# Patient Record
Sex: FEMALE | Race: WHITE | NOT HISPANIC OR LATINO | Employment: FULL TIME | URBAN - METROPOLITAN AREA
[De-identification: names, ages, dates, MRNs, and addresses within clinical notes are randomized per-mention and may not be internally consistent; named-entity substitution may affect disease eponyms.]

---

## 2023-10-10 ENCOUNTER — ANCILLARY PROCEDURE (OUTPATIENT)
Dept: RADIOLOGY | Facility: CLINIC | Age: 53
End: 2023-10-10
Payer: COMMERCIAL

## 2023-10-10 DIAGNOSIS — M45.0 ANKYLOSING SPONDYLITIS OF MULTIPLE SITES IN SPINE (MULTI): ICD-10-CM

## 2023-10-10 DIAGNOSIS — M47.22 OTHER SPONDYLOSIS WITH RADICULOPATHY, CERVICAL REGION: ICD-10-CM

## 2023-10-10 DIAGNOSIS — M50.320 OTHER CERVICAL DISC DEGENERATION, MID-CERVICAL REGION, UNSPECIFIED LEVEL: ICD-10-CM

## 2023-10-10 PROCEDURE — 73521 X-RAY EXAM HIPS BI 2 VIEWS: CPT | Mod: FY

## 2023-10-10 PROCEDURE — 72070 X-RAY EXAM THORAC SPINE 2VWS: CPT | Mod: FY

## 2023-10-10 PROCEDURE — 72141 MRI NECK SPINE W/O DYE: CPT

## 2023-10-17 ENCOUNTER — APPOINTMENT (OUTPATIENT)
Dept: SPORTS MEDICINE | Facility: HOSPITAL | Age: 53
End: 2023-10-17
Payer: COMMERCIAL

## 2023-11-02 DIAGNOSIS — F32.A DEPRESSION, UNSPECIFIED DEPRESSION TYPE: Primary | ICD-10-CM

## 2023-11-02 RX ORDER — VENLAFAXINE HYDROCHLORIDE 75 MG/1
75 CAPSULE, EXTENDED RELEASE ORAL
Qty: 90 CAPSULE | Refills: 0 | Status: SHIPPED | OUTPATIENT
Start: 2023-11-02 | End: 2023-11-22 | Stop reason: SDUPTHER

## 2023-11-02 RX ORDER — VENLAFAXINE HYDROCHLORIDE 75 MG/1
75 CAPSULE, EXTENDED RELEASE ORAL
COMMUNITY
End: 2023-11-02 | Stop reason: SDUPTHER

## 2023-11-21 DIAGNOSIS — E53.8 B12 DEFICIENCY: Primary | ICD-10-CM

## 2023-11-21 RX ORDER — AMANTADINE HYDROCHLORIDE 100 MG/1
2 TABLET ORAL DAILY
COMMUNITY
Start: 2021-10-11

## 2023-11-21 RX ORDER — CYANOCOBALAMIN 1000 UG/ML
1000 INJECTION, SOLUTION INTRAMUSCULAR; SUBCUTANEOUS ONCE
Qty: 1 ML | Refills: 11 | Status: SHIPPED | OUTPATIENT
Start: 2023-11-21 | End: 2023-11-21

## 2023-11-22 DIAGNOSIS — F32.A DEPRESSION, UNSPECIFIED DEPRESSION TYPE: ICD-10-CM

## 2023-11-22 DIAGNOSIS — F32.A DEPRESSION, UNSPECIFIED DEPRESSION TYPE: Primary | ICD-10-CM

## 2023-11-22 RX ORDER — VENLAFAXINE HYDROCHLORIDE 150 MG/1
150 CAPSULE, EXTENDED RELEASE ORAL DAILY
Qty: 90 CAPSULE | Refills: 0 | Status: SHIPPED | OUTPATIENT
Start: 2023-11-22 | End: 2024-02-22

## 2023-11-22 RX ORDER — VENLAFAXINE HYDROCHLORIDE 75 MG/1
75 CAPSULE, EXTENDED RELEASE ORAL
Qty: 90 CAPSULE | Refills: 0 | Status: SHIPPED | OUTPATIENT
Start: 2023-11-22 | End: 2023-11-22 | Stop reason: ALTCHOICE

## 2024-02-02 DIAGNOSIS — N39.0 URINARY TRACT INFECTION WITHOUT HEMATURIA, SITE UNSPECIFIED: Primary | ICD-10-CM

## 2024-02-02 DIAGNOSIS — F32.A DEPRESSION, UNSPECIFIED DEPRESSION TYPE: ICD-10-CM

## 2024-02-02 DIAGNOSIS — R42 DIZZY: ICD-10-CM

## 2024-02-02 RX ORDER — SCOLOPAMINE TRANSDERMAL SYSTEM 1 MG/1
1 PATCH, EXTENDED RELEASE TRANSDERMAL
Qty: 6 PATCH | Refills: 0 | Status: SHIPPED | OUTPATIENT
Start: 2024-02-02

## 2024-02-02 RX ORDER — VENLAFAXINE HYDROCHLORIDE 75 MG/1
75 CAPSULE, EXTENDED RELEASE ORAL DAILY
Qty: 90 CAPSULE | Refills: 0 | Status: SHIPPED | OUTPATIENT
Start: 2024-02-02 | End: 2024-04-30

## 2024-02-02 RX ORDER — SCOLOPAMINE TRANSDERMAL SYSTEM 1 MG/1
PATCH, EXTENDED RELEASE TRANSDERMAL
COMMUNITY
Start: 2016-06-12 | End: 2024-02-02 | Stop reason: SDUPTHER

## 2024-02-02 RX ORDER — SULFAMETHOXAZOLE AND TRIMETHOPRIM 800; 160 MG/1; MG/1
1 TABLET ORAL 2 TIMES DAILY
Qty: 10 TABLET | Refills: 0 | Status: SHIPPED | OUTPATIENT
Start: 2024-02-02 | End: 2024-02-07

## 2024-02-02 RX ORDER — FLUCONAZOLE 150 MG/1
150 TABLET ORAL ONCE
Qty: 1 TABLET | Refills: 0 | Status: SHIPPED | OUTPATIENT
Start: 2024-02-02 | End: 2024-02-02

## 2024-02-02 RX ORDER — SULFAMETHOXAZOLE AND TRIMETHOPRIM 800; 160 MG/1; MG/1
1 TABLET ORAL 2 TIMES DAILY
COMMUNITY
Start: 2015-12-29 | End: 2024-02-02 | Stop reason: SDUPTHER

## 2024-02-02 RX ORDER — MECLIZINE HYDROCHLORIDE 25 MG/1
25 TABLET ORAL 3 TIMES DAILY PRN
Qty: 30 TABLET | Refills: 0 | Status: SHIPPED | OUTPATIENT
Start: 2024-02-02 | End: 2025-02-01

## 2024-02-02 NOTE — PROGRESS NOTES
Patient traveling soon, due to urinary infection agreed on Bactrim and Diflucan prescription.    Refills needed on scopolamine patch meclizine and Effexor.

## 2024-02-19 DIAGNOSIS — F32.A DEPRESSION, UNSPECIFIED DEPRESSION TYPE: ICD-10-CM

## 2024-02-22 RX ORDER — VENLAFAXINE HYDROCHLORIDE 150 MG/1
150 CAPSULE, EXTENDED RELEASE ORAL DAILY
Qty: 90 CAPSULE | Refills: 0 | Status: SHIPPED | OUTPATIENT
Start: 2024-02-22 | End: 2024-06-03 | Stop reason: SDUPTHER

## 2024-03-13 ENCOUNTER — SPECIALTY PHARMACY (OUTPATIENT)
Dept: PHARMACY | Facility: CLINIC | Age: 54
End: 2024-03-13

## 2024-03-27 ENCOUNTER — SPECIALTY PHARMACY (OUTPATIENT)
Dept: PHARMACY | Facility: CLINIC | Age: 54
End: 2024-03-27

## 2024-04-24 ENCOUNTER — SPECIALTY PHARMACY (OUTPATIENT)
Dept: PHARMACY | Facility: CLINIC | Age: 54
End: 2024-04-24

## 2024-04-29 DIAGNOSIS — F32.A DEPRESSION, UNSPECIFIED DEPRESSION TYPE: ICD-10-CM

## 2024-04-30 RX ORDER — VENLAFAXINE HYDROCHLORIDE 75 MG/1
75 CAPSULE, EXTENDED RELEASE ORAL DAILY
Qty: 90 CAPSULE | Refills: 0 | Status: SHIPPED | OUTPATIENT
Start: 2024-04-30 | End: 2024-06-03 | Stop reason: SDUPTHER

## 2024-05-01 PROCEDURE — RXMED WILLOW AMBULATORY MEDICATION CHARGE

## 2024-05-02 ENCOUNTER — SPECIALTY PHARMACY (OUTPATIENT)
Dept: PHARMACY | Facility: CLINIC | Age: 54
End: 2024-05-02

## 2024-05-02 ENCOUNTER — PHARMACY VISIT (OUTPATIENT)
Dept: PHARMACY | Facility: CLINIC | Age: 54
End: 2024-05-02
Payer: COMMERCIAL

## 2024-05-03 ENCOUNTER — SPECIALTY PHARMACY (OUTPATIENT)
Dept: PHARMACY | Facility: CLINIC | Age: 54
End: 2024-05-03

## 2024-05-30 ENCOUNTER — SPECIALTY PHARMACY (OUTPATIENT)
Dept: PHARMACY | Facility: CLINIC | Age: 54
End: 2024-05-30

## 2024-05-30 ENCOUNTER — PHARMACY VISIT (OUTPATIENT)
Dept: PHARMACY | Facility: CLINIC | Age: 54
End: 2024-05-30
Payer: COMMERCIAL

## 2024-05-30 PROCEDURE — RXMED WILLOW AMBULATORY MEDICATION CHARGE

## 2024-06-03 ENCOUNTER — APPOINTMENT (OUTPATIENT)
Dept: SPORTS MEDICINE | Facility: HOSPITAL | Age: 54
End: 2024-06-03
Payer: COMMERCIAL

## 2024-06-03 DIAGNOSIS — F32.A DEPRESSION, UNSPECIFIED DEPRESSION TYPE: ICD-10-CM

## 2024-06-03 RX ORDER — VENLAFAXINE HYDROCHLORIDE 75 MG/1
75 CAPSULE, EXTENDED RELEASE ORAL DAILY
Qty: 90 CAPSULE | Refills: 0 | Status: SHIPPED | OUTPATIENT
Start: 2024-06-03 | End: 2024-09-01

## 2024-06-03 RX ORDER — VENLAFAXINE HYDROCHLORIDE 150 MG/1
150 CAPSULE, EXTENDED RELEASE ORAL DAILY
Qty: 90 CAPSULE | Refills: 0 | Status: SHIPPED | OUTPATIENT
Start: 2024-06-03 | End: 2024-09-01

## 2024-06-21 ENCOUNTER — SPECIALTY PHARMACY (OUTPATIENT)
Dept: RHEUMATOLOGY | Facility: CLINIC | Age: 54
End: 2024-06-21
Payer: COMMERCIAL

## 2024-06-24 ENCOUNTER — APPOINTMENT (OUTPATIENT)
Dept: PRIMARY CARE | Facility: CLINIC | Age: 54
End: 2024-06-24
Payer: COMMERCIAL

## 2024-06-24 VITALS — HEIGHT: 66 IN | BODY MASS INDEX: 24.05 KG/M2

## 2024-06-24 DIAGNOSIS — Z15.89 HLA B27 (HLA B27 POSITIVE): Primary | ICD-10-CM

## 2024-06-24 PROCEDURE — 1036F TOBACCO NON-USER: CPT | Performed by: FAMILY MEDICINE

## 2024-06-24 PROCEDURE — 99213 OFFICE O/P EST LOW 20 MIN: CPT | Performed by: FAMILY MEDICINE

## 2024-06-24 ASSESSMENT — ENCOUNTER SYMPTOMS
DEPRESSION: 0
OCCASIONAL FEELINGS OF UNSTEADINESS: 0
LOSS OF SENSATION IN FEET: 0

## 2024-06-24 NOTE — PROGRESS NOTES
Pt has virt fuv, no concerns.       Chief Complaint:  No chief complaint on file.  History Of Present Illness:   Gaby Mcnamara is a 54 y.o. female        Gaby Mcnamara is a 54 year old female presenting for a virtual visit.    Virtual or Telephone Consent    An interactive audio and video telecommunication system which permits real time communications between the patient (at the originating site) and provider (at the distant site) was utilized to provide this telehealth service.   Verbal consent was requested and obtained from Gaby Mcnamara on this date, 06/24/24 for a telehealth visit.            Weight: She has been using saxenda off-and-on since 2016.  Her weight history: November 2016 weight was 206 pounds. March 2018 her weight was 185 pounds. Dec 2019 she 195 pounds. Feb 20, 2020: 181.   Aug 11, 2020: 170 lbs.   Her goal weight is 165 pounds.  Saxenda very helpful for her and she is not getting the same results off the medication.   Aug 2021 update: she's gained weight. off saxenda now. going through treatments with rheumatology.   September 2021 update: She did get the wegovy injection.  Jan 2022 update: she is close to her goal weight.   Feb 2022- wegovy coupon ran out. She went on her cruise and was at 155 lbs which was her goal weight and she was able to maintain.   April 9, 2022- on her birthday she was enjoying cupcakes with her family and she felt strange, pt's sister has DM and checked pt's blood sugar which was 120. we had a discussion that although pt's last hemoglobin A1c did not show diabetes, that her story is consistent with hyperglycemia. Weight today: 172 lbs.   June 9, 2022: 158 lbs.   Nov 2022 update: 152 lbs. (goal 148-152 lbs). ozempic. but there is a insurance change. 0.5mg every 2 weeks. plan to fill 0.5mg and then it will last her several weeks. also consider wegovy if it starts being covered.   April 2023 update: weight is about 140 lbs.   June 2024 update: was around 150lbs, in Feb   she was on prednisone for a month and is closer to 175 lbs. She did go a 3rd party clinic and is taking monjuro but not losing weight.           Ortho- had b/l hip injections. b/l knees. Right distal humerus pain. left hip, left knee, left foot tendinitis and plantar fasciitis.     Mood- doing well on effexor. Adding buspar Oct 2019 has been very helpful. Stress: her mother  after 2020. 2021 update: Her Effexor was recently increased.  2022: Regarding her Effexor prescription, she originally stated that she would like 225 mg capsules. We tried a prescription but unfortunately it was not covered. We then agreed to lower the dosage to 150 mg daily, she states that she believes that she will do well on this dosage.  2024 update: current dosage of 225mg of effexor.      Positive HLA-B27. seeing rheumatology. 2022 update: Dr. Schmidt was filling Velafaxine, topamax and rx naltrexone compounding pharmacy. I agreed for a possible 3 month gap rx for the compound naltrexone in between rheumatologists.   2024 update: has an independent rheum- Dr. Sierra Moss for rheumatology. She is on Taltz injections once a month. On tizanadine at night. Also 300mg gabapentin at night time.     B12 def- on B12.     Vit D def- on vit D.        Other PMH: history of dizziness. Hx of IBS with constipation.      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      PSH: right breast surgery. uterine ablation. bladder lifting procedure.   FH: father: MI at age 49, psoriatic arthritis. mother: Alzheimer's.      COVID: got the vaccine.      Colonoscopy: neg cologuadot Aug 2021.      Healthcare team:  - rheum- 2024 update: has an independent rheumatologist.     Tobacco: never      Work: DME for .     Colonoscopy:     GYN: has a GYN                       Review of Systems:     Negative  Constitutional:   - fever   - chills   - night sweats  - unexpected weight change  - changes in sleep    "  Eyes:   - loss of vision  - double vision  - floaters     Ear/Nose/Throat/Mouth:   - sore throat  - hearing changes  - sinus congestion     Cardiovascular:   - chest pain  - chest heaviness  - palpitations  - swelling in ankles          Respiratory:   - shortness of breath  - difficulty breathing  - frequent cough  - wheezing     Neurological:   - loss of consciousness  - tremors  - dizzy spells  - numbness   - tingling     Gastrointestinal:   - abdominal pain  - nausea  - vomiting  - constipation  - diarrhea  - bloody stools  - loss of bowel control  - indigestion  - heartburn  - sour taste in throat when waking up     Genitourinary:   - urinary incontinence  - increased urinary frequency  - painful urination  - blood in urine     Skin:   - Rash  - lumps or bumps  - worrisome moles     Endocrine:   - excessive thirst  - feeling too hot  - feeling too cold  - feeling tired  - fatigue      Hematologic/Lymphatic:   - swollen glands  - blood clotting problems  - easy bruising.               Positive  Psychological:   - feeling generally happy  - feeling safe at home            Last Recorded Vitals:  Vitals:    06/24/24 1324   Height: 1.676 m (5' 6\")        Past Medical History:  She has no past medical history on file.     Past Surgical History:  She has a past surgical history that includes Other surgical history (06/11/2021); Other surgical history (06/11/2021); and Other surgical history (06/11/2021).     Social History:  She reports that she has never smoked. She has never used smokeless tobacco. No history on file for alcohol use and drug use.     Family History:  No family history on file.  Allergies:  Codeine     Outpatient Medications:  Current Outpatient Medications   Medication Instructions    amantadine (Symmetrel) 100 mg tablet 2 tablets, oral, Daily    ixekizumab (Taltz Autoinjector) 80 mg/mL injection INJECT 80 MG (1 ML) UNDER THE SKIN EVERY 4 WEEKS    ixekizumab (Taltz Autoinjector, 2 Pack,) 80 mg/mL " injection Inject 2 pens (160mg) under the skin at Week 0, then 1 pen (80mg) every 4 weeks thereafter.    meclizine (ANTIVERT) 25 mg, oral, 3 times daily PRN    scopolamine (Transderm-Scop) 1 mg over 3 days patch 3 day 1 patch, transdermal, Every 72 hours    venlafaxine XR (EFFEXOR-XR) 75 mg, oral, Daily, Take with food.    venlafaxine XR (EFFEXOR-XR) 150 mg, oral, Daily, Take with food.        Physical Exam:  GENERAL: Well developed, well nourished, alert and cooperative, and appears to be in no acute distress.     PSYCH: mood pleasant and appropriate     HEAD: normocephalic     EYES: PERRL, EOMI. vision is grossly intact.      LUNGS: Good respiratory effort.        GAIT: Normal                 Assessment/Plan   Problem List Items Addressed This Visit    None  Visit Diagnoses         Codes    HLA B27 (HLA B27 positive)    -  Primary Z15.89             Virtual visit.    Handicap placard.    I can refill your meds.     Follow up with me for an in person visit, and at least 6 months intervals.      Oswaldo Esquivel, DO

## 2024-06-28 ENCOUNTER — SPECIALTY PHARMACY (OUTPATIENT)
Dept: PHARMACY | Facility: CLINIC | Age: 54
End: 2024-06-28

## 2024-06-28 PROCEDURE — RXMED WILLOW AMBULATORY MEDICATION CHARGE

## 2024-07-01 ENCOUNTER — PHARMACY VISIT (OUTPATIENT)
Dept: PHARMACY | Facility: CLINIC | Age: 54
End: 2024-07-01
Payer: COMMERCIAL

## 2024-07-01 ENCOUNTER — APPOINTMENT (OUTPATIENT)
Dept: SPORTS MEDICINE | Facility: HOSPITAL | Age: 54
End: 2024-07-01
Payer: COMMERCIAL

## 2024-07-24 ENCOUNTER — SPECIALTY PHARMACY (OUTPATIENT)
Dept: PHARMACY | Facility: CLINIC | Age: 54
End: 2024-07-24

## 2024-07-24 PROCEDURE — RXMED WILLOW AMBULATORY MEDICATION CHARGE

## 2024-07-30 ENCOUNTER — PHARMACY VISIT (OUTPATIENT)
Dept: PHARMACY | Facility: CLINIC | Age: 54
End: 2024-07-30
Payer: COMMERCIAL

## 2024-08-24 ENCOUNTER — SPECIALTY PHARMACY (OUTPATIENT)
Dept: PHARMACY | Facility: CLINIC | Age: 54
End: 2024-08-24

## 2024-08-24 PROCEDURE — RXMED WILLOW AMBULATORY MEDICATION CHARGE

## 2024-08-27 ENCOUNTER — PHARMACY VISIT (OUTPATIENT)
Dept: PHARMACY | Facility: CLINIC | Age: 54
End: 2024-08-27
Payer: COMMERCIAL

## 2024-08-27 DIAGNOSIS — Z78.0 MENOPAUSE: ICD-10-CM

## 2024-08-27 NOTE — TELEPHONE ENCOUNTER
Patient would like a refill on Prempro 0.625-5 MG Oral Tablet  [facility] [phone]

## 2024-08-29 RX ORDER — CONJUGATED ESTROGENS AND MEDROXYPROGESTERONE ACETATE .625; 5 MG/1; MG/1
1 TABLET, SUGAR COATED ORAL DAILY
Qty: 30 TABLET | Refills: 1 | Status: SHIPPED | OUTPATIENT
Start: 2024-08-29 | End: 2025-08-29

## 2024-08-30 DIAGNOSIS — F32.A DEPRESSION, UNSPECIFIED DEPRESSION TYPE: ICD-10-CM

## 2024-08-30 RX ORDER — VENLAFAXINE HYDROCHLORIDE 150 MG/1
150 CAPSULE, EXTENDED RELEASE ORAL DAILY
Qty: 90 CAPSULE | Refills: 0 | Status: SHIPPED | OUTPATIENT
Start: 2024-08-30 | End: 2024-11-28

## 2024-09-10 ENCOUNTER — APPOINTMENT (OUTPATIENT)
Dept: OBSTETRICS AND GYNECOLOGY | Facility: CLINIC | Age: 54
End: 2024-09-10
Payer: COMMERCIAL

## 2024-09-23 ENCOUNTER — APPOINTMENT (OUTPATIENT)
Dept: OBSTETRICS AND GYNECOLOGY | Facility: CLINIC | Age: 54
End: 2024-09-23
Payer: COMMERCIAL

## 2024-09-23 VITALS
HEIGHT: 66 IN | SYSTOLIC BLOOD PRESSURE: 111 MMHG | BODY MASS INDEX: 25.71 KG/M2 | WEIGHT: 160 LBS | DIASTOLIC BLOOD PRESSURE: 69 MMHG

## 2024-09-23 DIAGNOSIS — R68.82 DIMINISHED LIBIDO: ICD-10-CM

## 2024-09-23 DIAGNOSIS — Z78.0 MENOPAUSE: ICD-10-CM

## 2024-09-23 DIAGNOSIS — Z01.419 ENCOUNTER FOR GYNECOLOGICAL EXAMINATION: Primary | ICD-10-CM

## 2024-09-23 DIAGNOSIS — Z12.31 BREAST CANCER SCREENING BY MAMMOGRAM: ICD-10-CM

## 2024-09-23 PROCEDURE — 87624 HPV HI-RISK TYP POOLED RSLT: CPT

## 2024-09-23 PROCEDURE — 88175 CYTOPATH C/V AUTO FLUID REDO: CPT

## 2024-09-23 RX ORDER — CONJUGATED ESTROGENS AND MEDROXYPROGESTERONE ACETATE .625; 5 MG/1; MG/1
1 TABLET, SUGAR COATED ORAL DAILY
Qty: 90 TABLET | Refills: 3 | Status: SHIPPED | OUTPATIENT
Start: 2024-09-23 | End: 2025-09-23

## 2024-09-23 RX ORDER — TIZANIDINE 4 MG/1
TABLET ORAL
COMMUNITY
Start: 2024-02-29

## 2024-09-23 RX ORDER — CELECOXIB 200 MG/1
CAPSULE ORAL
COMMUNITY
Start: 2024-07-01

## 2024-09-23 RX ORDER — GABAPENTIN 300 MG/1
CAPSULE ORAL
COMMUNITY
Start: 2024-09-19

## 2024-09-23 RX ORDER — ERGOCALCIFEROL 1.25 MG/1
CAPSULE ORAL
COMMUNITY
Start: 2020-02-21

## 2024-09-23 ASSESSMENT — PATIENT HEALTH QUESTIONNAIRE - PHQ9
SUM OF ALL RESPONSES TO PHQ9 QUESTIONS 1 & 2: 0
1. LITTLE INTEREST OR PLEASURE IN DOING THINGS: NOT AT ALL
2. FEELING DOWN, DEPRESSED OR HOPELESS: NOT AT ALL

## 2024-09-23 ASSESSMENT — PAIN SCALES - GENERAL: PAINLEVEL: 0-NO PAIN

## 2024-09-26 ENCOUNTER — SPECIALTY PHARMACY (OUTPATIENT)
Dept: PHARMACY | Facility: CLINIC | Age: 54
End: 2024-09-26

## 2024-09-26 PROCEDURE — RXMED WILLOW AMBULATORY MEDICATION CHARGE

## 2024-09-26 ASSESSMENT — ENCOUNTER SYMPTOMS
PSYCHIATRIC NEGATIVE: 1
MUSCULOSKELETAL NEGATIVE: 1
CONSTITUTIONAL NEGATIVE: 1
EYES NEGATIVE: 1
ALLERGIC/IMMUNOLOGIC NEGATIVE: 1
CARDIOVASCULAR NEGATIVE: 1
ENDOCRINE NEGATIVE: 1
HEMATOLOGIC/LYMPHATIC NEGATIVE: 1
RESPIRATORY NEGATIVE: 1
NEUROLOGICAL NEGATIVE: 1
GASTROINTESTINAL NEGATIVE: 1

## 2024-09-26 NOTE — PROGRESS NOTES
Subjective   Patient ID: Gaby Mcnamara is a 54 y.o. female who presents for Annual Exam (Last pap:  9/30/2019  neg/neg./Last monica:  12/3/2022  cat 1./Last colon screen:  8/8/2021./Declines chaperone.   Lore Mak LPN).  HPI patient is here for annual visit she complains of sexual dysfunction and lack of libido    Review of Systems   Constitutional: Negative.    Eyes: Negative.    Respiratory: Negative.     Cardiovascular: Negative.    Gastrointestinal: Negative.    Endocrine: Negative.    Genitourinary: Negative.    Musculoskeletal: Negative.    Skin: Negative.    Allergic/Immunologic: Negative.    Neurological: Negative.    Hematological: Negative.    Psychiatric/Behavioral: Negative.         Objective   Physical Exam  Constitutional:       Appearance: Normal appearance.   HENT:      Head: Normocephalic and atraumatic.   Cardiovascular:      Rate and Rhythm: Normal rate and regular rhythm.      Pulses: Normal pulses.      Heart sounds: Normal heart sounds.   Pulmonary:      Effort: Pulmonary effort is normal.      Breath sounds: Normal breath sounds.   Abdominal:      General: Abdomen is flat. Bowel sounds are normal.      Palpations: Abdomen is soft.      Hernia: There is no hernia in the left inguinal area or right inguinal area.   Genitourinary:     General: Normal vulva.      Exam position: Lithotomy position.      Labia:         Right: No rash, tenderness or lesion.         Left: No rash, tenderness or lesion.       Urethra: No prolapse.      Cervix: Normal.      Uterus: Normal.       Adnexa: Right adnexa normal and left adnexa normal.      Comments: Vaginal atrophy  Musculoskeletal:      Cervical back: Normal range of motion and neck supple.   Skin:     General: Skin is warm and dry.   Neurological:      General: No focal deficit present.      Mental Status: She is alert and oriented to person, place, and time.         Assessment/Plan    menopausal symptoms  Start Prempro 2.5 mg daily  Mammogram  Pap  test HPV typing  Referral to sexual medicine         Dakota Bhagat MD 09/26/24 5:20 PM

## 2024-09-27 ENCOUNTER — PHARMACY VISIT (OUTPATIENT)
Dept: PHARMACY | Facility: CLINIC | Age: 54
End: 2024-09-27
Payer: COMMERCIAL

## 2024-10-11 ENCOUNTER — OFFICE VISIT (OUTPATIENT)
Dept: URGENT CARE | Age: 54
End: 2024-10-11
Payer: COMMERCIAL

## 2024-10-11 ENCOUNTER — ANCILLARY PROCEDURE (OUTPATIENT)
Dept: URGENT CARE | Age: 54
End: 2024-10-11
Payer: COMMERCIAL

## 2024-10-11 VITALS
DIASTOLIC BLOOD PRESSURE: 74 MMHG | SYSTOLIC BLOOD PRESSURE: 124 MMHG | WEIGHT: 160 LBS | HEART RATE: 84 BPM | HEIGHT: 65 IN | BODY MASS INDEX: 26.66 KG/M2 | TEMPERATURE: 98.3 F | OXYGEN SATURATION: 98 %

## 2024-10-11 DIAGNOSIS — J40 BRONCHITIS: ICD-10-CM

## 2024-10-11 DIAGNOSIS — R05.1 ACUTE COUGH: Primary | ICD-10-CM

## 2024-10-11 DIAGNOSIS — R05.1 ACUTE COUGH: ICD-10-CM

## 2024-10-11 PROCEDURE — 71046 X-RAY EXAM CHEST 2 VIEWS: CPT | Performed by: NURSE PRACTITIONER

## 2024-10-11 RX ORDER — METHYLPREDNISOLONE 4 MG/1
TABLET ORAL
Qty: 21 TABLET | Refills: 0 | Status: SHIPPED | OUTPATIENT
Start: 2024-10-11 | End: 2024-10-18

## 2024-10-11 RX ORDER — AMOXICILLIN 875 MG/1
875 TABLET, FILM COATED ORAL 2 TIMES DAILY
Qty: 20 TABLET | Refills: 0 | Status: SHIPPED | OUTPATIENT
Start: 2024-10-11 | End: 2024-10-21

## 2024-10-11 NOTE — PROGRESS NOTES
Subjective   Patient ID: Gaby Mcnamara is a 54 y.o. female. They present today with a chief complaint of Cough (Patient has hx of bronchitis and feels its the beginning of it again. Cough, runny nose, headache, pressure on chest).    History of Present Illness  53 yo female coming in for cough, runny nose, headache, and pain in the chest with breathing. She states this has been going on for a few days. She denies any fevers or chills.     Past Medical History  Allergies as of 10/11/2024 - Reviewed 10/11/2024   Allergen Reaction Noted    Codeine Hallucinations 2010       (Not in a hospital admission)       Past Medical History:   Diagnosis Date    Ankylosing spondylitis     Anxiety     Depression        Past Surgical History:   Procedure Laterality Date    BLADDER SURGERY      BREAST BIOPSY Right     ORIF RADIUS & ULNA FRACTURES Left     OTHER SURGICAL HISTORY  2021    Appendectomy    OTHER SURGICAL HISTORY  2021    Bladder surgery    OTHER SURGICAL HISTORY  2021     section        reports that she has never smoked. She has never used smokeless tobacco. She reports that she does not currently use alcohol. She reports that she does not use drugs.    Review of Systems  Review of Systems:  General: No weight loss, fatigue, anorexia, insomnia, fever, chills.  Eyes: No vision loss, double vision, blurred vision, drainage, or eye pain.  ENT: No pharyngitis, dry mouth, positive nasal congestion, no ear pain  Cardiac: No chest pain, palpitations, syncope, near syncope.  Pulmonary:  No shortness of breath, positive cough, no hemoptysis  Heme/lymph: No swollen glands, fever, bleeding  GI: No abdominal pain, change in bowel habits, melena, hematemesis, hematochezia, nausea, vomiting, or diarrhea  : No discharge, dysuria, frequency, urgency, hematuria  Musculoskeletal: No limb pain, joint pain, joint swelling.  Skin: No rashes  Neuro: No numbness, tingling, headaches                            "      Objective    Vitals:    10/11/24 1701   BP: 124/74   Pulse: 84   Temp: 36.8 °C (98.3 °F)   TempSrc: Oral   SpO2: 98%   Weight: 72.6 kg (160 lb)   Height: 1.651 m (5' 5\")     No LMP recorded. Patient is postmenopausal.    Physical Exam  Physical Exam:  General: Vital noted, no distress. Afebrile  Neck: Supple. No meningismus through full range of motion. No lymphadenopathy.  Cardiac: Regular rate and rhythm, no murmur  Pulmonary: Lungs clear bilaterally with good aeration. No adventitious breath sounds.  Musculoskeletal: No peripheral edema.   Skin: No rashes  Neuro: No focal neurologic deficits, NIH score of 0.      Procedures    Point of Care Test & Imaging Results from this visit  XR chest 2 views    Result Date: 10/11/2024  Impression: No evidence of acute intrathoracic abnormality.   Signed by: Darien Young 10/11/2024 5:33 PM Dictation workstation:   [id number]         Diagnostic study results (if any) were reviewed by LAMAR Morel.    Assessment/Plan   Allergies, medications, history, and pertinent labs/EKGs/Imaging reviewed by LAMAR Morel.     Medical Decision Making  Testing: chest x-ray  Treatment: Amoxicillin and medrol dose pack prescribed  Differential: 1) pleurisy, 2) bronchitis , 3) pneumonia  Plan: Discussed differential with the pateint. Patient will follow up with the PCP in the next 2-3 days. Return for any worsening symptoms or go to the ER for further evaluation. Patient understands return precautions and discharege insturctions.  Impression:   1) bronchitis      Orders and Diagnoses  Diagnoses and all orders for this visit:  Acute cough  -     XR chest 2 views; Future  Bronchitis  -     amoxicillin (Amoxil) 875 mg tablet; Take 1 tablet (875 mg) by mouth 2 times a day for 10 days.  -     methylPREDNISolone (Medrol Dospak) 4 mg tablets; Take as directed on package.      Medical Admin Record      Patient disposition: Home    Electronically signed by Jahaira Yeager, " PRINCESS-CNP  5:47 PM

## 2024-10-26 DIAGNOSIS — F32.A DEPRESSION, UNSPECIFIED DEPRESSION TYPE: ICD-10-CM

## 2024-10-28 ENCOUNTER — SPECIALTY PHARMACY (OUTPATIENT)
Dept: PHARMACY | Facility: CLINIC | Age: 54
End: 2024-10-28

## 2024-10-28 PROCEDURE — RXMED WILLOW AMBULATORY MEDICATION CHARGE

## 2024-10-28 RX ORDER — VENLAFAXINE HYDROCHLORIDE 75 MG/1
75 CAPSULE, EXTENDED RELEASE ORAL DAILY
Qty: 90 CAPSULE | Refills: 0 | Status: SHIPPED | OUTPATIENT
Start: 2024-10-28 | End: 2025-01-26

## 2024-10-29 ENCOUNTER — HOSPITAL ENCOUNTER (OUTPATIENT)
Dept: RADIOLOGY | Facility: CLINIC | Age: 54
Discharge: HOME | End: 2024-10-29
Payer: COMMERCIAL

## 2024-10-29 VITALS — WEIGHT: 160.05 LBS | HEIGHT: 65 IN | BODY MASS INDEX: 26.67 KG/M2

## 2024-10-29 DIAGNOSIS — Z12.31 BREAST CANCER SCREENING BY MAMMOGRAM: ICD-10-CM

## 2024-10-29 PROCEDURE — 77067 SCR MAMMO BI INCL CAD: CPT | Performed by: RADIOLOGY

## 2024-10-29 PROCEDURE — 77063 BREAST TOMOSYNTHESIS BI: CPT | Performed by: RADIOLOGY

## 2024-10-29 PROCEDURE — 77067 SCR MAMMO BI INCL CAD: CPT

## 2024-10-30 ENCOUNTER — APPOINTMENT (OUTPATIENT)
Dept: RADIOLOGY | Facility: CLINIC | Age: 54
End: 2024-10-30
Payer: COMMERCIAL

## 2024-11-01 ENCOUNTER — PHARMACY VISIT (OUTPATIENT)
Dept: PHARMACY | Facility: CLINIC | Age: 54
End: 2024-11-01
Payer: COMMERCIAL

## 2024-11-01 ENCOUNTER — SPECIALTY PHARMACY (OUTPATIENT)
Dept: PHARMACY | Facility: CLINIC | Age: 54
End: 2024-11-01

## 2024-11-21 ENCOUNTER — TELEMEDICINE (OUTPATIENT)
Dept: PRIMARY CARE | Facility: CLINIC | Age: 54
End: 2024-11-21
Payer: COMMERCIAL

## 2024-11-21 VITALS — HEIGHT: 65 IN | BODY MASS INDEX: 26.63 KG/M2

## 2024-11-21 DIAGNOSIS — N39.0 URINARY TRACT INFECTION WITHOUT HEMATURIA, SITE UNSPECIFIED: Primary | ICD-10-CM

## 2024-11-21 PROCEDURE — 99213 OFFICE O/P EST LOW 20 MIN: CPT | Performed by: FAMILY MEDICINE

## 2024-11-21 RX ORDER — SULFAMETHOXAZOLE AND TRIMETHOPRIM 800; 160 MG/1; MG/1
1 TABLET ORAL 2 TIMES DAILY
Qty: 10 TABLET | Refills: 0 | Status: SHIPPED | OUTPATIENT
Start: 2024-11-21 | End: 2024-11-26

## 2024-11-21 ASSESSMENT — ENCOUNTER SYMPTOMS
OCCASIONAL FEELINGS OF UNSTEADINESS: 0
LOSS OF SENSATION IN FEET: 0
DEPRESSION: 0

## 2024-11-21 NOTE — PROGRESS NOTES
Virtual UTI      Chief Complaint:  No chief complaint on file.  History Of Present Illness:   Gaby Mcnamara is a 54 y.o. female        Gaby Mcnamara is a 54 year old female presenting for a virtual visit.    Virtual or Telephone Consent    An interactive audio and video telecommunication system which permits real time communications between the patient (at the originating site) and provider (at the distant site) was utilized to provide this telehealth service.   Verbal consent was requested and obtained from Gaby Mcnamara on this date, 11/21/24 for a telehealth visit.       UTI- doing azo OTC now.  Cipro doesn't work. Bactrim has been helpful in the past.        Weight: She has been using saxenda off-and-on since 2016.  Her weight history: November 2016 weight was 206 pounds. March 2018 her weight was 185 pounds. Dec 2019 she 195 pounds. Feb 20, 2020: 181.   Aug 11, 2020: 170 lbs.   Her goal weight is 165 pounds.  Saxenda very helpful for her and she is not getting the same results off the medication.   Aug 2021 update: she's gained weight. off saxenda now. going through treatments with rheumatology.   September 2021 update: She did get the wegovy injection.  Jan 2022 update: she is close to her goal weight.   Feb 2022- wegovy coupon ran out. She went on her cruise and was at 155 lbs which was her goal weight and she was able to maintain.   April 9, 2022- on her birthday she was enjoying cupcakes with her family and she felt strange, pt's sister has DM and checked pt's blood sugar which was 120. we had a discussion that although pt's last hemoglobin A1c did not show diabetes, that her story is consistent with hyperglycemia. Weight today: 172 lbs.   June 9, 2022: 158 lbs.   Nov 2022 update: 152 lbs. (goal 148-152 lbs). ozempic. but there is a insurance change. 0.5mg every 2 weeks. plan to fill 0.5mg and then it will last her several weeks. also consider wegovy if it starts being covered.   April 2023 update: weight  is about 140 lbs.   2024 update: was around 150lbs, in 2024 she was on prednisone for a month and is closer to 175 lbs. She did go a 3rd party clinic and is taking monjuro but not losing weight.   2024 update: weight is 158 lbs.           Ortho- had b/l hip injections. b/l knees. Right distal humerus pain. left hip, left knee, left foot tendinitis and plantar fasciitis.     Mood- doing well on effexor. Adding buspar Oct 2019 has been very helpful. Stress: her mother  after 2020. 2021 update: Her Effexor was recently increased.  2022: Regarding her Effexor prescription, she originally stated that she would like 225 mg capsules. We tried a prescription but unfortunately it was not covered. We then agreed to lower the dosage to 150 mg daily, she states that she believes that she will do well on this dosage.  2024 update: current dosage of 225mg of effexor.      Positive HLA-B27. seeing rheumatology. 2022 update: Dr. Schmidt was filling Velafaxine, topamax and rx naltrexone compounding pharmacy. I agreed for a possible 3 month gap rx for the compound naltrexone in between rheumatologists.   2024 update: has an independent rheum- Dr. Sierra Moss for rheumatology. She is on Taltz injections once a month. On tizanadine at night. Also 300mg gabapentin at night time.     B12 def- on B12.     Vit D def- on vit D.        Other PMH: history of dizziness. Hx of IBS with constipation.      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      PSH: right breast surgery. uterine ablation. bladder lifting procedure.   FH: father: MI at age 49, psoriatic arthritis. mother: Alzheimer's.      COVID: got the vaccine.      Colonoscopy: neg rajan Aug 2021.      Healthcare team:  - rheum- 2024 update: has an independent rheumatologist.     Tobacco: never      Work: DME for .     Colonoscopy:     GYN: has a GYN                       Review of Systems:    "  Negative  Constitutional:   - fever   - chills   - night sweats  - unexpected weight change  - changes in sleep     Eyes:   - loss of vision  - double vision  - floaters     Ear/Nose/Throat/Mouth:   - sore throat  - hearing changes  - sinus congestion     Cardiovascular:   - chest pain  - chest heaviness  - palpitations  - swelling in ankles          Respiratory:   - shortness of breath  - difficulty breathing  - frequent cough  - wheezing     Neurological:   - loss of consciousness  - tremors  - dizzy spells  - numbness   - tingling     Gastrointestinal:   - abdominal pain  - nausea  - vomiting  - constipation  - diarrhea  - bloody stools  - loss of bowel control  - indigestion  - heartburn  - sour taste in throat when waking up     Genitourinary:   - urinary incontinence  - increased urinary frequency  - painful urination  - blood in urine     Skin:   - Rash  - lumps or bumps  - worrisome moles     Endocrine:   - excessive thirst  - feeling too hot  - feeling too cold  - feeling tired  - fatigue      Hematologic/Lymphatic:   - swollen glands  - blood clotting problems  - easy bruising.               Positive  Psychological:   - feeling generally happy  - feeling safe at home            Last Recorded Vitals:  Vitals:    11/21/24 1007   Height: 1.651 m (5' 5\")        Past Medical History:  She has a past medical history of Ankylosing spondylitis, Anxiety, and Depression.     Past Surgical History:  She has a past surgical history that includes Other surgical history (06/11/2021); Other surgical history (06/11/2021); Other surgical history (06/11/2021); ORIF radius & ulna fractures (Left); Breast biopsy (Right); and Bladder surgery.     Social History:  She reports that she has never smoked. She has never used smokeless tobacco. She reports that she does not currently use alcohol. She reports that she does not use drugs.     Family History:  Family History   Problem Relation Name Age of Onset    Breast cancer " Paternal Grandmother 65     Breast cancer Father's Sister 58      Allergies:  Codeine     Outpatient Medications:  Current Outpatient Medications   Medication Instructions    celecoxib (CeleBREX) 200 mg capsule take 1 capsule by mouth every day with food for 30 days    conj estrog-medroxyprogest ace (Prempro) 0.625-5 mg tablet 1 tablet, oral, Daily    ergocalciferol (Vitamin D-2) 1.25 MG (71274 UT) capsule oral    gabapentin (Neurontin) 300 mg capsule     ixekizumab (Taltz Autoinjector) 80 mg/mL injection INJECT 80 MG (1 ML) UNDER THE SKIN EVERY 4 WEEKS    ixekizumab (Taltz Autoinjector) 80 mg/mL injection 1 application below the skin every 2 weeks; INJECT 80 MG (1 ML) UNDER THE SKIN EVERY 2 WEEKS    ixekizumab (Taltz Autoinjector, 2 Pack,) 80 mg/mL injection Inject 2 pens (160mg) under the skin at Week 0, then 1 pen (80mg) every 4 weeks thereafter.    meclizine (ANTIVERT) 25 mg, oral, 3 times daily PRN    scopolamine (Transderm-Scop) 1 mg over 3 days patch 3 day 1 patch, transdermal, Every 72 hours    tiZANidine (Zanaflex) 4 mg tablet TAKE 1/2 TO 1 TAB BY MOUTH 3 TIMES DAILY AS NEEDED    venlafaxine XR (EFFEXOR-XR) 150 mg, oral, Daily, Take with food.    venlafaxine XR (EFFEXOR-XR) 75 mg, oral, Daily, Take with food.        Physical Exam:  GENERAL: Well developed, well nourished, alert and cooperative, and appears to be in no acute distress.     PSYCH: mood pleasant and appropriate     HEAD: normocephalic     EYES: PERRL, EOMI. vision is grossly intact.      LUNGS: Good respiratory effort.        GAIT: Normal                 1. Urinary tract infection without hematuria, site unspecified                Virtual visit.    UTI sick visit. Agreed on bactrim antibiotic.     Follow up with me for an in person visit, and at least 6 months intervals.      Oswaldo Esquivel DO

## 2024-11-26 ENCOUNTER — SPECIALTY PHARMACY (OUTPATIENT)
Dept: PHARMACY | Facility: CLINIC | Age: 54
End: 2024-11-26

## 2024-11-29 DIAGNOSIS — F32.A DEPRESSION, UNSPECIFIED DEPRESSION TYPE: ICD-10-CM

## 2024-12-02 ENCOUNTER — SPECIALTY PHARMACY (OUTPATIENT)
Dept: PHARMACY | Facility: CLINIC | Age: 54
End: 2024-12-02

## 2024-12-02 RX ORDER — VENLAFAXINE HYDROCHLORIDE 150 MG/1
150 CAPSULE, EXTENDED RELEASE ORAL DAILY
Qty: 90 CAPSULE | Refills: 0 | Status: SHIPPED | OUTPATIENT
Start: 2024-12-02 | End: 2025-03-02

## 2024-12-05 PROCEDURE — RXMED WILLOW AMBULATORY MEDICATION CHARGE

## 2024-12-06 ENCOUNTER — PHARMACY VISIT (OUTPATIENT)
Dept: PHARMACY | Facility: CLINIC | Age: 54
End: 2024-12-06
Payer: COMMERCIAL

## 2025-01-07 ENCOUNTER — SPECIALTY PHARMACY (OUTPATIENT)
Dept: PHARMACY | Facility: CLINIC | Age: 55
End: 2025-01-07

## 2025-01-21 PROCEDURE — RXMED WILLOW AMBULATORY MEDICATION CHARGE

## 2025-01-24 DIAGNOSIS — F32.A DEPRESSION, UNSPECIFIED DEPRESSION TYPE: ICD-10-CM

## 2025-01-27 ENCOUNTER — TELEPHONE (OUTPATIENT)
Dept: PRIMARY CARE | Facility: CLINIC | Age: 55
End: 2025-01-27
Payer: COMMERCIAL

## 2025-01-27 DIAGNOSIS — R42 DIZZY: ICD-10-CM

## 2025-01-27 RX ORDER — SCOPOLAMINE 1 MG/3D
1 PATCH, EXTENDED RELEASE TRANSDERMAL
Qty: 6 PATCH | Refills: 0 | Status: SHIPPED | OUTPATIENT
Start: 2025-01-27

## 2025-01-27 RX ORDER — VENLAFAXINE HYDROCHLORIDE 75 MG/1
75 CAPSULE, EXTENDED RELEASE ORAL DAILY
Qty: 90 CAPSULE | Refills: 0 | Status: SHIPPED | OUTPATIENT
Start: 2025-01-27 | End: 2025-04-27

## 2025-01-28 ENCOUNTER — SPECIALTY PHARMACY (OUTPATIENT)
Dept: PHARMACY | Facility: CLINIC | Age: 55
End: 2025-01-28

## 2025-01-28 ENCOUNTER — PHARMACY VISIT (OUTPATIENT)
Dept: PHARMACY | Facility: CLINIC | Age: 55
End: 2025-01-28
Payer: COMMERCIAL

## 2025-02-17 ENCOUNTER — SPECIALTY PHARMACY (OUTPATIENT)
Dept: PHARMACY | Facility: CLINIC | Age: 55
End: 2025-02-17

## 2025-02-17 PROCEDURE — RXMED WILLOW AMBULATORY MEDICATION CHARGE

## 2025-02-24 ENCOUNTER — SPECIALTY PHARMACY (OUTPATIENT)
Dept: PHARMACY | Facility: CLINIC | Age: 55
End: 2025-02-24

## 2025-02-24 ENCOUNTER — PHARMACY VISIT (OUTPATIENT)
Dept: PHARMACY | Facility: CLINIC | Age: 55
End: 2025-02-24
Payer: COMMERCIAL

## 2025-02-28 ENCOUNTER — OFFICE VISIT (OUTPATIENT)
Dept: URGENT CARE | Age: 55
End: 2025-02-28
Payer: COMMERCIAL

## 2025-02-28 VITALS
SYSTOLIC BLOOD PRESSURE: 105 MMHG | HEIGHT: 65 IN | OXYGEN SATURATION: 98 % | HEART RATE: 101 BPM | DIASTOLIC BLOOD PRESSURE: 65 MMHG | WEIGHT: 149 LBS | BODY MASS INDEX: 24.83 KG/M2 | TEMPERATURE: 97.5 F | RESPIRATION RATE: 19 BRPM

## 2025-02-28 DIAGNOSIS — J02.9 SORE THROAT: ICD-10-CM

## 2025-02-28 DIAGNOSIS — J01.00 ACUTE NON-RECURRENT MAXILLARY SINUSITIS: Primary | ICD-10-CM

## 2025-02-28 DIAGNOSIS — R68.89 FLU-LIKE SYMPTOMS: ICD-10-CM

## 2025-02-28 DIAGNOSIS — R05.9 COUGH, UNSPECIFIED TYPE: ICD-10-CM

## 2025-02-28 DIAGNOSIS — F32.A DEPRESSION, UNSPECIFIED DEPRESSION TYPE: ICD-10-CM

## 2025-02-28 LAB
POC RAPID INFLUENZA A: NEGATIVE
POC RAPID INFLUENZA B: NEGATIVE
POC RAPID STREP: NEGATIVE
POC SARS-COV-2 AG BINAX: NORMAL

## 2025-02-28 RX ORDER — AMOXICILLIN AND CLAVULANATE POTASSIUM 875; 125 MG/1; MG/1
875 TABLET, FILM COATED ORAL 2 TIMES DAILY
Qty: 14 TABLET | Refills: 0 | Status: SHIPPED | OUTPATIENT
Start: 2025-02-28 | End: 2025-03-07

## 2025-02-28 RX ORDER — VENLAFAXINE HYDROCHLORIDE 150 MG/1
150 CAPSULE, EXTENDED RELEASE ORAL DAILY
Qty: 90 CAPSULE | Refills: 0 | Status: SHIPPED | OUTPATIENT
Start: 2025-02-28 | End: 2025-05-29

## 2025-02-28 ASSESSMENT — ENCOUNTER SYMPTOMS
FATIGUE: 1
ABDOMINAL PAIN: 0
MYALGIAS: 1
SHORTNESS OF BREATH: 0
DIZZINESS: 0
NUMBNESS: 0
SORE THROAT: 1

## 2025-02-28 NOTE — PROGRESS NOTES
"Subjective   Patient ID: Gaby Mcnamara is a 54 y.o. female. They present today with a chief complaint of Headache (Feels it's a covid headache/Fever 102/Nausea /Vomitting /Fatigue /Sore throat/Earaches/All started Monday).  Patient reports that she recently got off a triggers 10 days ago.  Has been sick for the past 5 days.  Reports history of previous sinus infections.  Symptoms improving with over-the-counter measures.  Decreased oral intake.      Past Medical History  Allergies as of 2025 - Reviewed 2025   Allergen Reaction Noted    Codeine Hallucinations 2010       (Not in a hospital admission)       Past Medical History:   Diagnosis Date    Ankylosing spondylitis     Anxiety     Depression        Past Surgical History:   Procedure Laterality Date    BLADDER SURGERY      BREAST BIOPSY Right     ORIF RADIUS & ULNA FRACTURES Left     OTHER SURGICAL HISTORY  2021    Appendectomy    OTHER SURGICAL HISTORY  2021    Bladder surgery    OTHER SURGICAL HISTORY  2021     section        reports that she has never smoked. She has never used smokeless tobacco. She reports that she does not currently use alcohol. She reports that she does not use drugs.    Review of Systems  Review of Systems   Constitutional:  Positive for fatigue.   HENT:  Positive for sore throat.    Respiratory:  Negative for shortness of breath.    Cardiovascular:  Negative for chest pain.   Gastrointestinal:  Negative for abdominal pain.   Musculoskeletal:  Positive for myalgias.   Neurological:  Negative for dizziness and numbness.                                  Objective    Vitals:    25 0810   BP: 105/65   Pulse: 101   Resp: 19   Temp: 36.4 °C (97.5 °F)   TempSrc: Oral   SpO2: 98%   Weight: 67.6 kg (149 lb)   Height: 1.651 m (5' 5\")     No LMP recorded. Patient has had an ablation.    Physical Exam  Vitals and nursing note reviewed.   Constitutional:       Appearance: Normal appearance.   HENT:     "  Head:      Comments: Erythematous posterior oropharynx.  Normal TM bilateral.  No peritonsillar abscess.  Eyes:      Conjunctiva/sclera: Conjunctivae normal.   Cardiovascular:      Rate and Rhythm: Tachycardia present.   Pulmonary:      Effort: Pulmonary effort is normal.   Neurological:      Mental Status: She is alert.   Psychiatric:         Mood and Affect: Mood normal.         Procedures    Point of Care Test & Imaging Results from this visit  Results for orders placed or performed in visit on 02/28/25   POCT BinaxNOW Covid-19 Ag Card manually resulted   Result Value Ref Range    POC SAJAN-COV-2 AG  Presumptive negative test for SARS-CoV-2 (no antigen detected)     Presumptive negative test for SARS-CoV-2 (no antigen detected)   POCT Influenza A/B manually resulted   Result Value Ref Range    POC Rapid Influenza A Negative Negative    POC Rapid Influenza B Negative Negative   POCT rapid strep A manually resulted   Result Value Ref Range    POC Rapid Strep Negative Negative      No results found.    Diagnostic study results (if any) were reviewed by Elkin Gonzalez PA-C.    Assessment/Plan   Allergies, medications, history, and pertinent labs/EKGs/Imaging reviewed by Elkin Gonzalez PA-C.     Medical Decision Making  COVID, flu and strep negative.  Mild tachycardia.  Concern for some dehydration with education on increasing oral hydration.  Patient continues to be febrile after 5 days.  If symptoms not improving will send prescription for Augmentin for sinusitis.  Otherwise supportive measures and strict return precautions.  Lungs clear, no concern for pneumonia, pneumothorax or effusion.    Orders and Diagnoses  Diagnoses and all orders for this visit:  Cough, unspecified type  -     POCT BinaxNOW Covid-19 Ag Card manually resulted  Flu-like symptoms  -     POCT Influenza A/B manually resulted  Sore throat  -     POCT rapid strep A manually resulted      Medical Admin Record      Patient disposition:  Home    Electronically signed by Elkin Gonzalez PA-C  8:24 AM

## 2025-03-23 PROCEDURE — RXMED WILLOW AMBULATORY MEDICATION CHARGE

## 2025-03-24 ENCOUNTER — PHARMACY VISIT (OUTPATIENT)
Dept: PHARMACY | Facility: CLINIC | Age: 55
End: 2025-03-24
Payer: COMMERCIAL

## 2025-03-24 ENCOUNTER — SPECIALTY PHARMACY (OUTPATIENT)
Dept: PHARMACY | Facility: CLINIC | Age: 55
End: 2025-03-24

## 2025-04-04 LAB
25(OH)D3+25(OH)D2 SERPL-MCNC: 57 NG/ML (ref 30–100)
ALBUMIN SERPL-MCNC: 4.5 G/DL (ref 3.6–5.1)
ALBUMIN/GLOB SERPL: 2 (CALC) (ref 1–2.5)
ALP SERPL-CCNC: 49 U/L (ref 37–153)
ALT SERPL-CCNC: 11 U/L (ref 6–29)
AST SERPL-CCNC: 15 U/L (ref 10–35)
BASOPHILS # BLD AUTO: 20 CELLS/UL (ref 0–200)
BASOPHILS NFR BLD AUTO: 0.4 %
BILIRUB SERPL-MCNC: 0.4 MG/DL (ref 0.2–1.2)
BUN SERPL-MCNC: 14 MG/DL (ref 7–25)
BUN/CREAT SERPL: ABNORMAL (CALC) (ref 6–22)
CALCIUM SERPL-MCNC: 9.1 MG/DL (ref 8.6–10.4)
CHLORIDE SERPL-SCNC: 103 MMOL/L (ref 98–110)
CO2 SERPL-SCNC: 28 MMOL/L (ref 20–32)
CREAT SERPL-MCNC: 0.78 MG/DL (ref 0.5–1.03)
CRP SERPL-MCNC: <3 MG/L
EGFRCR SERPLBLD CKD-EPI 2021: 91 ML/MIN/1.73M2
EOSINOPHIL # BLD AUTO: 39 CELLS/UL (ref 15–500)
EOSINOPHIL NFR BLD AUTO: 0.8 %
ERYTHROCYTE [DISTWIDTH] IN BLOOD BY AUTOMATED COUNT: 12.7 % (ref 11–15)
ERYTHROCYTE [SEDIMENTATION RATE] IN BLOOD BY WESTERGREN METHOD: 2 MM/H
GLOBULIN SER CALC-MCNC: 2.2 G/DL (CALC) (ref 1.9–3.7)
GLUCOSE SERPL-MCNC: 59 MG/DL (ref 65–139)
HCT VFR BLD AUTO: 41.3 % (ref 35–45)
HGB BLD-MCNC: 13.6 G/DL (ref 11.7–15.5)
LYMPHOCYTES # BLD AUTO: 1201 CELLS/UL (ref 850–3900)
LYMPHOCYTES NFR BLD AUTO: 24.5 %
MAGNESIUM SERPL-MCNC: 2.3 MG/DL (ref 1.5–2.5)
MCH RBC QN AUTO: 31.1 PG (ref 27–33)
MCHC RBC AUTO-ENTMCNC: 32.9 G/DL (ref 32–36)
MCV RBC AUTO: 94.3 FL (ref 80–100)
MONOCYTES # BLD AUTO: 559 CELLS/UL (ref 200–950)
MONOCYTES NFR BLD AUTO: 11.4 %
NEUTROPHILS # BLD AUTO: 3082 CELLS/UL (ref 1500–7800)
NEUTROPHILS NFR BLD AUTO: 62.9 %
PLATELET # BLD AUTO: 283 THOUSAND/UL (ref 140–400)
PMV BLD REES-ECKER: 9.9 FL (ref 7.5–12.5)
POTASSIUM SERPL-SCNC: 4.4 MMOL/L (ref 3.5–5.3)
PROT SERPL-MCNC: 6.7 G/DL (ref 6.1–8.1)
RBC # BLD AUTO: 4.38 MILLION/UL (ref 3.8–5.1)
SODIUM SERPL-SCNC: 139 MMOL/L (ref 135–146)
T4 FREE SERPL-MCNC: 1.2 NG/DL (ref 0.8–1.8)
TSH SERPL-ACNC: 0.99 MIU/L
VIT B12 SERPL-MCNC: >2000 PG/ML (ref 200–1100)
WBC # BLD AUTO: 4.9 THOUSAND/UL (ref 3.8–10.8)

## 2025-04-22 ENCOUNTER — SPECIALTY PHARMACY (OUTPATIENT)
Dept: PHARMACY | Facility: CLINIC | Age: 55
End: 2025-04-22

## 2025-04-29 ENCOUNTER — SPECIALTY PHARMACY (OUTPATIENT)
Dept: PHARMACY | Facility: CLINIC | Age: 55
End: 2025-04-29

## 2025-05-05 ENCOUNTER — SPECIALTY PHARMACY (OUTPATIENT)
Dept: PHARMACY | Facility: CLINIC | Age: 55
End: 2025-05-05

## 2025-05-05 ENCOUNTER — PHARMACY VISIT (OUTPATIENT)
Dept: PHARMACY | Facility: CLINIC | Age: 55
End: 2025-05-05
Payer: COMMERCIAL

## 2025-05-05 PROCEDURE — RXMED WILLOW AMBULATORY MEDICATION CHARGE

## 2025-05-25 DIAGNOSIS — F32.A DEPRESSION, UNSPECIFIED DEPRESSION TYPE: ICD-10-CM

## 2025-05-27 DIAGNOSIS — F32.A DEPRESSION, UNSPECIFIED DEPRESSION TYPE: ICD-10-CM

## 2025-05-27 RX ORDER — VENLAFAXINE HYDROCHLORIDE 150 MG/1
150 CAPSULE, EXTENDED RELEASE ORAL DAILY
Qty: 90 CAPSULE | Refills: 0 | Status: SHIPPED | OUTPATIENT
Start: 2025-05-27 | End: 2025-08-25

## 2025-05-27 RX ORDER — VENLAFAXINE HYDROCHLORIDE 150 MG/1
150 CAPSULE, EXTENDED RELEASE ORAL DAILY
Qty: 90 CAPSULE | Refills: 0 | OUTPATIENT
Start: 2025-05-27 | End: 2025-08-25

## 2025-05-28 ENCOUNTER — SPECIALTY PHARMACY (OUTPATIENT)
Dept: PHARMACY | Facility: CLINIC | Age: 55
End: 2025-05-28

## 2025-05-28 PROCEDURE — RXMED WILLOW AMBULATORY MEDICATION CHARGE

## 2025-05-29 ENCOUNTER — PHARMACY VISIT (OUTPATIENT)
Dept: PHARMACY | Facility: CLINIC | Age: 55
End: 2025-05-29
Payer: COMMERCIAL

## 2025-06-06 PROCEDURE — RXMED WILLOW AMBULATORY MEDICATION CHARGE

## 2025-06-09 ENCOUNTER — PHARMACY VISIT (OUTPATIENT)
Dept: PHARMACY | Facility: CLINIC | Age: 55
End: 2025-06-09
Payer: COMMERCIAL

## 2025-06-11 DIAGNOSIS — Z12.12 SCREENING FOR COLORECTAL CANCER: ICD-10-CM

## 2025-06-11 DIAGNOSIS — Z12.11 SCREENING FOR COLORECTAL CANCER: ICD-10-CM

## 2025-06-24 ENCOUNTER — TELEPHONE (OUTPATIENT)
Dept: PRIMARY CARE | Facility: CLINIC | Age: 55
End: 2025-06-24
Payer: COMMERCIAL

## 2025-07-01 ENCOUNTER — APPOINTMENT (OUTPATIENT)
Dept: PRIMARY CARE | Facility: CLINIC | Age: 55
End: 2025-07-01
Payer: COMMERCIAL

## 2025-07-01 ENCOUNTER — SPECIALTY PHARMACY (OUTPATIENT)
Dept: PHARMACY | Facility: CLINIC | Age: 55
End: 2025-07-01

## 2025-07-01 PROCEDURE — RXMED WILLOW AMBULATORY MEDICATION CHARGE

## 2025-07-16 ENCOUNTER — PHARMACY VISIT (OUTPATIENT)
Dept: PHARMACY | Facility: CLINIC | Age: 55
End: 2025-07-16
Payer: COMMERCIAL

## 2025-07-24 ENCOUNTER — APPOINTMENT (OUTPATIENT)
Dept: PRIMARY CARE | Facility: CLINIC | Age: 55
End: 2025-07-24
Payer: COMMERCIAL

## 2025-07-31 ENCOUNTER — SPECIALTY PHARMACY (OUTPATIENT)
Dept: PHARMACY | Facility: CLINIC | Age: 55
End: 2025-07-31

## 2025-07-31 PROCEDURE — RXMED WILLOW AMBULATORY MEDICATION CHARGE

## 2025-08-01 ENCOUNTER — PHARMACY VISIT (OUTPATIENT)
Dept: PHARMACY | Facility: CLINIC | Age: 55
End: 2025-08-01
Payer: COMMERCIAL

## 2025-08-11 ENCOUNTER — SPECIALTY PHARMACY (OUTPATIENT)
Dept: PHARMACY | Facility: CLINIC | Age: 55
End: 2025-08-11

## 2025-08-11 ENCOUNTER — PHARMACY VISIT (OUTPATIENT)
Dept: PHARMACY | Facility: CLINIC | Age: 55
End: 2025-08-11
Payer: COMMERCIAL

## 2025-08-24 DIAGNOSIS — F32.A DEPRESSION, UNSPECIFIED DEPRESSION TYPE: ICD-10-CM

## 2025-08-25 PROCEDURE — RXMED WILLOW AMBULATORY MEDICATION CHARGE

## 2025-08-27 ENCOUNTER — PHARMACY VISIT (OUTPATIENT)
Dept: PHARMACY | Facility: CLINIC | Age: 55
End: 2025-08-27
Payer: COMMERCIAL

## 2025-08-28 RX ORDER — VENLAFAXINE HYDROCHLORIDE 150 MG/1
150 CAPSULE, EXTENDED RELEASE ORAL DAILY
Qty: 90 CAPSULE | Refills: 0 | Status: SHIPPED | OUTPATIENT
Start: 2025-08-28 | End: 2025-11-26

## 2025-09-18 ENCOUNTER — APPOINTMENT (OUTPATIENT)
Dept: PRIMARY CARE | Facility: CLINIC | Age: 55
End: 2025-09-18
Payer: COMMERCIAL